# Patient Record
Sex: MALE | Race: WHITE | Employment: FULL TIME | URBAN - METROPOLITAN AREA
[De-identification: names, ages, dates, MRNs, and addresses within clinical notes are randomized per-mention and may not be internally consistent; named-entity substitution may affect disease eponyms.]

---

## 2022-01-04 ENCOUNTER — APPOINTMENT (OUTPATIENT)
Dept: CT IMAGING | Age: 57
End: 2022-01-04
Payer: COMMERCIAL

## 2022-01-04 ENCOUNTER — HOSPITAL ENCOUNTER (EMERGENCY)
Age: 57
Discharge: HOME | End: 2022-01-04
Attending: EMERGENCY MEDICINE
Payer: COMMERCIAL

## 2022-01-04 VITALS
RESPIRATION RATE: 14 BRPM | DIASTOLIC BLOOD PRESSURE: 90 MMHG | OXYGEN SATURATION: 98 % | BODY MASS INDEX: 27.09 KG/M2 | HEIGHT: 72 IN | SYSTOLIC BLOOD PRESSURE: 168 MMHG | HEART RATE: 82 BPM | TEMPERATURE: 97.8 F | WEIGHT: 200 LBS

## 2022-01-04 DIAGNOSIS — R42 DIZZINESS: Primary | ICD-10-CM

## 2022-01-04 DIAGNOSIS — I67.82 SUBCORTICAL MICROVASCULAR ISCHEMIC OCCLUSIVE DISEASE: ICD-10-CM

## 2022-01-04 LAB
ALBUMIN SERPL-MCNC: 5 GM/DL (ref 3.4–5)
ALP BLD-CCNC: 100 IU/L (ref 40–129)
ALT SERPL-CCNC: 17 U/L (ref 10–40)
ANION GAP SERPL CALCULATED.3IONS-SCNC: 14 MMOL/L (ref 4–16)
AST SERPL-CCNC: 17 IU/L (ref 15–37)
BASOPHILS ABSOLUTE: 0.1 K/CU MM
BASOPHILS RELATIVE PERCENT: 0.8 % (ref 0–1)
BILIRUB SERPL-MCNC: 0.4 MG/DL (ref 0–1)
BUN BLDV-MCNC: 12 MG/DL (ref 6–23)
CALCIUM SERPL-MCNC: 9.7 MG/DL (ref 8.3–10.6)
CHLORIDE BLD-SCNC: 101 MMOL/L (ref 99–110)
CO2: 24 MMOL/L (ref 21–32)
CREAT SERPL-MCNC: 0.9 MG/DL (ref 0.9–1.3)
DIFFERENTIAL TYPE: ABNORMAL
EKG ATRIAL RATE: 69 BPM
EKG DIAGNOSIS: NORMAL
EKG P AXIS: 56 DEGREES
EKG P-R INTERVAL: 168 MS
EKG Q-T INTERVAL: 388 MS
EKG QRS DURATION: 88 MS
EKG QTC CALCULATION (BAZETT): 415 MS
EKG R AXIS: 63 DEGREES
EKG T AXIS: 61 DEGREES
EKG VENTRICULAR RATE: 69 BPM
EOSINOPHILS ABSOLUTE: 0.1 K/CU MM
EOSINOPHILS RELATIVE PERCENT: 1.7 % (ref 0–3)
GFR AFRICAN AMERICAN: >60 ML/MIN/1.73M2
GFR NON-AFRICAN AMERICAN: >60 ML/MIN/1.73M2
GLUCOSE BLD-MCNC: 118 MG/DL (ref 70–99)
HCT VFR BLD CALC: 46.4 % (ref 42–52)
HEMOGLOBIN: 16.1 GM/DL (ref 13.5–18)
IMMATURE NEUTROPHIL %: 0.8 % (ref 0–0.43)
LYMPHOCYTES ABSOLUTE: 1.4 K/CU MM
LYMPHOCYTES RELATIVE PERCENT: 17.6 % (ref 24–44)
MCH RBC QN AUTO: 32.3 PG (ref 27–31)
MCHC RBC AUTO-ENTMCNC: 34.7 % (ref 32–36)
MCV RBC AUTO: 93.2 FL (ref 78–100)
MONOCYTES ABSOLUTE: 0.7 K/CU MM
MONOCYTES RELATIVE PERCENT: 8.4 % (ref 0–4)
PDW BLD-RTO: 12.8 % (ref 11.7–14.9)
PLATELET # BLD: 218 K/CU MM (ref 140–440)
PMV BLD AUTO: 11 FL (ref 7.5–11.1)
POTASSIUM SERPL-SCNC: 4.1 MMOL/L (ref 3.5–5.1)
RBC # BLD: 4.98 M/CU MM (ref 4.6–6.2)
SEGMENTED NEUTROPHILS ABSOLUTE COUNT: 5.5 K/CU MM
SEGMENTED NEUTROPHILS RELATIVE PERCENT: 70.7 % (ref 36–66)
SODIUM BLD-SCNC: 139 MMOL/L (ref 135–145)
TOTAL IMMATURE NEUTOROPHIL: 0.06 K/CU MM
TOTAL PROTEIN: 7.2 GM/DL (ref 6.4–8.2)
WBC # BLD: 7.7 K/CU MM (ref 4–10.5)

## 2022-01-04 PROCEDURE — 93005 ELECTROCARDIOGRAM TRACING: CPT | Performed by: EMERGENCY MEDICINE

## 2022-01-04 PROCEDURE — 85025 COMPLETE CBC W/AUTO DIFF WBC: CPT

## 2022-01-04 PROCEDURE — 70498 CT ANGIOGRAPHY NECK: CPT

## 2022-01-04 PROCEDURE — 99284 EMERGENCY DEPT VISIT MOD MDM: CPT

## 2022-01-04 PROCEDURE — 70450 CT HEAD/BRAIN W/O DYE: CPT

## 2022-01-04 PROCEDURE — 6360000004 HC RX CONTRAST MEDICATION: Performed by: EMERGENCY MEDICINE

## 2022-01-04 PROCEDURE — 6370000000 HC RX 637 (ALT 250 FOR IP): Performed by: EMERGENCY MEDICINE

## 2022-01-04 PROCEDURE — 80053 COMPREHEN METABOLIC PANEL: CPT

## 2022-01-04 PROCEDURE — 93010 ELECTROCARDIOGRAM REPORT: CPT | Performed by: INTERNAL MEDICINE

## 2022-01-04 RX ORDER — MECLIZINE HYDROCHLORIDE 25 MG/1
25 TABLET ORAL 3 TIMES DAILY PRN
Qty: 15 TABLET | Refills: 0 | Status: SHIPPED | OUTPATIENT
Start: 2022-01-04 | End: 2022-01-14

## 2022-01-04 RX ORDER — MECLIZINE HCL 12.5 MG/1
25 TABLET ORAL ONCE
Status: COMPLETED | OUTPATIENT
Start: 2022-01-04 | End: 2022-01-04

## 2022-01-04 RX ADMIN — IOPAMIDOL 75 ML: 755 INJECTION, SOLUTION INTRAVENOUS at 11:45

## 2022-01-04 RX ADMIN — MECLIZINE 25 MG: 12.5 TABLET ORAL at 10:03

## 2022-01-04 NOTE — ED NOTES
The patient presents to the er today with complaints of dizziness for a month and blurred vision that just recently started. He does have a PCP appointment later this month.                         Vincent Bravo, LATISHA  01/04/22 8612

## 2022-01-04 NOTE — ED NOTES
Pt resting, updated plan of care, call light in reach . Pt denies needs at this time.       Corine Ross RN  01/04/22 9682

## 2022-01-04 NOTE — ED PROVIDER NOTES
Triage Chief Complaint:   Dizziness (reports of dizziness for a month) and Eye Problem (reports of \"double vision\" )      [place]:  Juan Alberto Justice is a 64 y.o. male that presents to the emergency department with dizziness and double vision. He states that he has been dizzy for over a month. He states that he equates it to \"feeling drunk and like it had a few drinks. \"  He denies chest pain, shortness of breath, nausea, vomiting. He states that over the past week or so he is noted some double vision. 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 \"  He states that he has to cover one eye in order to see properly and then the symptoms resolved. He denies numbness tingling or weakness down his face, arms or legs. He denies dysarthria, dysphagia or facial droop. He is very concerned about the possibility of a stroke. He has no history of stroke. He does smoke daily. [facility]. No pertinent past medical history. Past Surgical History:   Procedure Laterality Date    TONSILLECTOMY       History reviewed. No pertinent family history. Social History     Socioeconomic History    Marital status:      Spouse name: Not on file    Number of children: Not on file    Years of education: Not on file    Highest education level: Not on file   Occupational History    Not on file   Tobacco Use    Smoking status: Current Every Day Smoker     Packs/day: 1.00     Types: Cigarettes    Smokeless tobacco: Never Used   Substance and Sexual Activity    Alcohol use:  Yes     Alcohol/week: 5.0 standard drinks     Types: 5 Cans of beer per week    Drug use: No    Sexual activity: Never   Other Topics Concern    Not on file   Social History Narrative    Not on file     Social Determinants of Health     Financial Resource Strain:     Difficulty of Paying Living Expenses: Not on file Food Insecurity:     Worried About Running Out of Food in the Last Year: Not on file    Jay of Food in the Last Year: Not on file   Transportation Needs:     Lack of Transportation (Medical): Not on file    Lack of Transportation (Non-Medical): Not on file   Physical Activity:     Days of Exercise per Week: Not on file    Minutes of Exercise per Session: Not on file   Stress:     Feeling of Stress : Not on file   Social Connections:     Frequency of Communication with Friends and Family: Not on file    Frequency of Social Gatherings with Friends and Family: Not on file    Attends Shinto Services: Not on file    Active Member of [address] [place] [address] [email or web address] or Organizations: Not on file    Attends Club or Organization Meetings: Not on file    Marital Status: Not on file   Intimate Partner Violence:     Fear of Current or Ex-Partner: Not on file    Emotionally Abused: Not on file    Physically Abused: Not on file    Sexually Abused: Not on file   Housing Stability:     Unable to Pay for Housing in the Last Year: Not on file    Number of [place] in the Last Year: Not on file    Unstable Housing in the Last Year: Not on file     No current facility-administered medications for this encounter. Current Outpatient Medications   Medication Sig Dispense Refill    meclizine (ANTIVERT) 25 MG tablet Take 1 tablet by mouth 3 times daily as needed for Dizziness 15 tablet 0     Allergies   Allergen Reactions    Codeine      Nursing Notes Reviewed    ROS:  At least 10 systems reviewed and otherwise negative except as in the [address] Physical Exam:  ED Triage Vitals [01/04/22 0846]   Enc Vitals Group      BP (!) 168/90      Pulse 82      Resp 14      Temp 97.8 °F (36.6 °C)      Temp Source Oral      SpO2 98 %      Weight 200 lb (90.7 kg)      Height 6' (1.829 m)      Head Circumference       Peak Flow       Pain Score       Pain Loc       Pain Edu? Excl. in [address]?       My pulse oximetry interpretation is which is within the normal range    GENERAL APPEARANCE: Awake and alert. Cooperative. No acute distress. HEAD:  Atraumatic. EYES: EOM's grossly intact. ENT: Mucous membranes are moist.  No trismus. Pupils equal round reactive to light  NECK:  Trachea midline. HEART: RRR. Radial pulses 2+. LUNGS: Respirations unlabored. CTAB  ABDOMEN: Soft. Non-tender. No guarding or rebound. EXTREMITIES: No acute deformities. SKIN: Warm and dry. NEUROLOGICAL: No gross facial drooping. Moves all 4 extremities spontaneously. NIH stroke scale 0. Ambulates with a steady gait. PSYCHIATRIC: Normal mood.     I have reviewed and interpreted all of the currently available lab results from this visit (if applicable):  Results for orders placed or performed during the hospital encounter of 01/04/22   CBC Auto Differential   Result Value Ref Range    WBC 7.7 4.0 - 10.5 K/CU MM    RBC 4.98 4.6 - 6.2 M/CU MM    Hemoglobin 16.1 13.5 - 18.0 GM/DL    Hematocrit 46.4 42 - 52 %    MCV 93.2 78 - 100 FL    MCH 32.3 (H) 27 - 31 PG    MCHC 34.7 32.0 - 36.0 %    RDW 12.8 11.7 - 14.9 %    Platelets 931 032 - 019 K/CU MM    MPV 11.0 7.5 - 11.1 FL    Differential Type AUTOMATED DIFFERENTIAL     Segs Relative 70.7 (H) 36 - 66 %    Lymphocytes % 17.6 (L) 24 - 44 %    Monocytes % 8.4 (H) 0 - 4 %    Eosinophils % 1.7 0 - 3 %    Basophils % 0.8 0 - 1 %    Segs Absolute 5.5 K/CU MM    Lymphocytes Absolute 1.4 K/CU MM    Monocytes Absolute 0.7 K/CU MM    Eosinophils Absolute 0.1 K/CU MM    Basophils Absolute 0.1 K/CU MM    Immature Neutrophil % 0.8 (H) 0 - 0.43 %    Total Immature Neutrophil 0.06 K/CU MM   Comprehensive Metabolic Panel w/ Reflex to MG   Result Value Ref Range    Sodium 139 135 - 145 MMOL/L    Potassium 4.1 3.5 - 5.1 MMOL/L    Chloride 101 99 - 110 mMol/L    CO2 24 21 - 32 MMOL/L    BUN 12 6 - 23 MG/DL    CREATININE 0.9 0.9 - 1.3 MG/DL    Glucose 118 (H) 70 - 99 MG/DL    Calcium 9.7 8.3 - 10.6 MG/DL    Albumin 5.0 3.4 - 5.0 GM/DL    Total Protein 7.2 6.4 - 8.2 GM/DL    Total Bilirubin 0.4 0.0 - 1.0 MG/DL    ALT 17 10 - 40 U/L    AST 17 15 - 37 IU/L    Alkaline Phosphatase 100 40 - 129 IU/L    GFR Non-African American >60 >60 mL/min/1.73m2    GFR African American >60 >60 mL/min/1.73m2    Anion Gap 14 4 - 16   EKG 12 Lead   Result Value Ref Range    Ventricular Rate 69 BPM    Atrial Rate 69 BPM    P-R Interval 168 ms    QRS Duration 88 ms    Q-T Interval 388 ms    QTc Calculation (Bazett) 415 ms    P Axis 56 degrees    R Axis 63 degrees    T Axis 61 degrees    Diagnosis       Normal sinus rhythm  Normal ECG  No previous ECGs available            EKG: (All EKG's are interpreted by myself in the absence of a cardiologist)  12 lead EKG:  Normal Sinus Rhythm 69  Axis is   Normal  QTc is  normal  There is no specific ST-T wave changes appreciated. There is no clear evidence of acute ischemia or infarction. It was compared against an EKG from none. MDM:  Patient's NIH stroke scale is 0. His blood work at this time is unremarkable. CT of the head shows no acute abnormalities. CTA of the head and neck shows a small chronic infarct within the right basal ganglia there is also mild chronic microvascular ischemic changes. They do note some stenosis of the right vertebral artery and mild stenosis of the left vertebral artery but no flow-limiting stenosis of the internal carotid arteries. Patient and I had a long discussion about all of the symptoms. I do not believe that his symptoms today represent an acute stroke nor is it seen on CAT scan or in my physical exam.  I did explain that he has likely had a stroke before and he does have some microvascular ischemic changes and also some stenosis. He does have a follow-up appointment with his primary care physician next week. I explained that at this point we need to mitigate his risk of stroke.   I suggested smoking cessation, increased exercise and watching his diet as well as following up with primary care physician for further evaluation of his blood pressure and cholesterol levels. Patient is agreeable with all of this and discharged home in good condition. Clinical Impression:  1. Dizziness    2.  Subcortical microvascular ischemic occlusive disease        Disposition Vitals:  [unfilled], [unfilled], [unfilled], [unfilled]    Disposition referral (if applicable):  BILLY Tamayovaishnavi [address]  [phone]    Schedule an appointment as soon as possible for a visit in 1 week        Disposition medications (if applicable):  Discharge Medication List as of 1/4/2022  1:01 PM      START taking these medications    Details   meclizine (ANTIVERT) 25 MG tablet Take 1 tablet by mouth 3 times daily as needed for Dizziness, Disp-15 tablet, R-0Normal               (Please note that portions of this note may have been completed with a voice recognition program. Efforts were made to edit the dictations but occasionally words are mis-transcribed.)    MD Flako Zhang MD  01/04/22 2741

## 2022-01-19 ENCOUNTER — OFFICE VISIT (OUTPATIENT)
Dept: NEUROLOGY | Age: 57
End: 2022-01-19
Payer: COMMERCIAL

## 2022-01-19 VITALS
DIASTOLIC BLOOD PRESSURE: 92 MMHG | HEART RATE: 66 BPM | OXYGEN SATURATION: 98 % | WEIGHT: 188 LBS | SYSTOLIC BLOOD PRESSURE: 162 MMHG | BODY MASS INDEX: 25.47 KG/M2 | HEIGHT: 72 IN

## 2022-01-19 DIAGNOSIS — I63.511 CEREBROVASCULAR ACCIDENT (CVA) DUE TO STENOSIS OF RIGHT MIDDLE CEREBRAL ARTERY (HCC): Primary | ICD-10-CM

## 2022-01-19 DIAGNOSIS — H53.2 BINOCULAR VISION DISORDER WITH DIPLOPIA: ICD-10-CM

## 2022-01-19 DIAGNOSIS — G43.809 OTHER MIGRAINE WITHOUT STATUS MIGRAINOSUS, NOT INTRACTABLE: ICD-10-CM

## 2022-01-19 DIAGNOSIS — F51.04 PSYCHOPHYSIOLOGIC INSOMNIA: ICD-10-CM

## 2022-01-19 DIAGNOSIS — G47.30 SLEEP APNEA, UNSPECIFIED TYPE: ICD-10-CM

## 2022-01-19 DIAGNOSIS — G47.419 SLEEP ATTACK: ICD-10-CM

## 2022-01-19 PROCEDURE — 99205 OFFICE O/P NEW HI 60 MIN: CPT | Performed by: STUDENT IN AN ORGANIZED HEALTH CARE EDUCATION/TRAINING PROGRAM

## 2022-01-19 RX ORDER — TIZANIDINE 4 MG/1
4 TABLET ORAL NIGHTLY
Qty: 30 TABLET | Refills: 2 | Status: SHIPPED | OUTPATIENT
Start: 2022-01-19

## 2022-01-19 RX ORDER — ATORVASTATIN CALCIUM 20 MG/1
20 TABLET, FILM COATED ORAL NIGHTLY
COMMUNITY
Start: 2022-01-13

## 2022-01-19 RX ORDER — GABAPENTIN 300 MG/1
300 CAPSULE ORAL 2 TIMES DAILY
COMMUNITY
Start: 2022-01-13

## 2022-01-19 RX ORDER — LISINOPRIL 10 MG/1
10 TABLET ORAL DAILY
COMMUNITY
Start: 2022-01-13

## 2022-01-19 RX ORDER — CLOPIDOGREL BISULFATE 75 MG/1
75 TABLET ORAL DAILY
COMMUNITY
Start: 2022-01-13

## 2022-01-19 NOTE — PROGRESS NOTES
1/19/22    Juan Alberto Justice  1965    Chief Complaint   Patient presents with    New Patient     Stroke       Neurologic Consult Note    Subjective    History of Present Illness  Mark Roblero is a 64 y.o. male presenting today for neurologic evaluation of possible stroke. He presented to [facility] emergency department on 1/4/2022 with a sensation of dizziness that he described as \"feeling drunk\". He also reported double vision intermittently present over the week prior. His NIH in the emergency department was 0. He was deemed not a candidate for tPA. CT head in the emergency department was nonacute but did demonstrate a small chronic infarct in the right basal ganglia as well as mild chronic microvascular ischemic changes. CTA did demonstrate mild stenosis of the bilateral vertebral arteries but was otherwise nonacute. 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 This lasted seconds and resolved. He tells me that essentially as soon as he noticed it he essentially startled himself and it was gone. This has since happened four more times. He was driving with each occurrence. He cannot recall what he was doing prior -- for example is uncertain if he had turned his head for a stephan change. He has been chronically tired. He does admit that he may have been tired with this episode. Denies vertigo with this. Denies focal weakness or paresthesias. He feels confused since that time and describes it as though he has had a couple drinks. Admits that it feels like brain fog. He admits to daily headache. He describes the headache as throbbing in nature and located bilateral, occipitally. He is sound sensitive. He has had nausea but no vomiting associated with the headache. He has never been on prescription medications for headache.     He tells me that he has had hypnogogic hallucinations. He tells me he is a chronically tired person. Denies symptoms consistent with cataplexy. He admits to having come close to falling asleep behind the wheel. He admits to snoring with nighttime apneic events. He admits to morning dry mouth and sore throat. He admits to morning headaches. He has difficulty falling asleep at nighttime due to mind wandering. He denies proximal muscle weakness or temporal course to his symptoms. He does admit to lightheadedness with looking up. He has never had a syncopal event in the past.     Denies personal history of seizure. Denies family history of seizure. He is a daily smoker and is actively trying to quit. Review of Symptoms:  Neurologic   Symptoms: no difficulty with gait or walking, no bowel symptoms, no vertigo, + confusion, no memory loss, no speech disorder, no visual loss, +double vision, no dizziness, no loss of hearing, no sensory disturbances, no weakness, no headaches, no bladder symptoms, no seizures, + excessive fatigue and no syncope    Current Outpatient Medications   Medication Sig Dispense Refill    tiZANidine (ZANAFLEX) 4 MG tablet Take 1 tablet by mouth nightly 30 tablet 2    lisinopril (PRINIVIL;ZESTRIL) 10 MG tablet Take 10 mg by mouth daily (Patient not taking: Reported on 1/19/2022)      atorvastatin (LIPITOR) 20 MG tablet Take 20 mg by mouth at bedtime (Patient not taking: Reported on 1/19/2022)      gabapentin (NEURONTIN) 300 MG capsule Take 300 mg by mouth 2 times daily. (Patient not taking: Reported on 1/19/2022)      clopidogrel (PLAVIX) 75 MG tablet Take 75 mg by mouth daily (Patient not taking: Reported on 1/19/2022)       No current facility-administered medications for this visit. No past medical history on file.     Past Surgical History:   Procedure Laterality Date    TONSILLECTOMY          Social History     Socioeconomic History    Marital status:      Spouse name: Not on file    Number of children: Not on file    Years of education: Not on file    Highest education level: Not on file   Occupational History    Not on file   Tobacco Use    Smoking status: Current Every Day Smoker     Packs/day: 1.00     Types: Cigarettes    Smokeless tobacco: Never Used   Substance and Sexual Activity    Alcohol use: Yes     Alcohol/week: 5.0 standard drinks     Types: 5 Cans of beer per week    Drug use: No    Sexual activity: Never   Other Topics Concern    Not on file   Social History Narrative    Not on file     Social Determinants of Health     Financial Resource Strain:     Difficulty of Paying Living Expenses: Not on file   Food Insecurity:     Worried About Running Out of Food in the Last Year: Not on file    Jay of Food in the Last Year: Not on file   Transportation Needs:     Lack of Transportation (Medical): Not on file    Lack of Transportation (Non-Medical): Not on file   Physical Activity:     Days of Exercise per Week: Not on file    Minutes of Exercise per Session: Not on file   Stress:     Feeling of Stress : Not on file   Social Connections:     Frequency of Communication with Friends and Family: Not on file    Frequency of Social Gatherings with Friends and Family: Not on file    Attends Evangelical Services: Not on file    Active Member of [address] [place] [address] [employer] or Organizations: Not on file    Attends Club or Organization Meetings: Not on file    Marital Status: Not on file   Intimate Partner Violence:     Fear of Current or Ex-Partner: Not on file    Emotionally Abused: Not on file    Physically Abused: Not on file    Sexually Abused: Not on file   Housing Stability:     Unable to Pay for Housing in the Last Year: Not on file    Number of [place] in the Last Year: Not on file    Unstable Housing in the Last Year: Not on file       No family history on file.     Objective    Physical Exam:  Also present during visit: Granddaughter, Prateek Lenz  Constitutional   Weight: well nourished  Heart/Vascular   No cyanosis or clubbing appreciated  Neck   Appearance/Palpation/Auscultation: supple  Mental Status   Orientation: oriented to person, oriented to place, oriented to problem and oriented to time   Mood/Affect: appropriate mood and appropriate affect   Memory/Other: recent memory intact, remote memory intact, fund of knowledge intact, attention span normal and concentration normal  Language   Language: (normal) language, no dysarthria, (normal) articulation and no dysphasia/aphasia  Cranial Nerves   CN II Right: visual fields appear intact   CN II Left: visual fields appear intact   CN III, IV, VI: EOM no nystagmus, normal pursuit and extraocular muscle strength normal   CN III: pupil normal size, pupil reactive to light and dark, pupil accomodates and no ptosis   CN IV: normal   CN VI: normal   CN V Right: normal sensation and muscles of mastication intact   CN V Left: normal sensation and muscles of mastication intact   CN VII Right: normal facial expression   CN VII Left: normal facial expression   CN VIII Right: hearing in tact to normal conversation   CN VIII Left: hearing in tact to normal conversation   CN IX,X: normal palatal movement   CN XI Right: normal sternocleidomastoid and normal trapezius   CN XI Left: normal sternocleidomastoid and normal trapezius   CN XII: no tremors of the tongue, no fasciculation of the tongue, tongue protrudes midline, normal power to left and normal power to right  Gait and Stance   Gait/Posture: station normal, ambulates independently, and gait normal  Motor/Coordination Exam   General: no bradykinesia, no tremors, no chorea, no athetosis, no myoclonus and no dyskinesia   Right Upper Extremity: normal motor strength, normal bulk and normal tone   Left Upper Extremity: normal motor strength, normal bulk and normal tone   Right Lower Extremity: normal motor strength, normal bulk and normal tone   Left Lower Extremity: normal motor strength, normal bulk and normal tone   Coordination: no drift, normal finger-to-nose, normal heel-to-shin and rapid alternating movements normal  Reflexes   Reflexes Right: 2/4 biceps, brachioradialis, patellar, and 1/4 achilles    Reflexes Left: 2/4 biceps, brachioradialis, patellar, and 1/4 achilles    Plantar Reflex Right: response downgoing   Plantar Reflex Left: response downgoing   Hoffmans Reflex Right: absent   Hoffmans Reflex Left: absent    Sensory   Sensation: normal light touch, normal temperature, normal vibration, normal DSS, and no neglect    Lungs   No auditory wheezing  Skin   Inspection: no jaundice, no lesions, no rashes and no cyanosis      BP (!) 162/92 (Site: Left Upper Arm, Position: Sitting, Cuff Size: Medium Adult)   Pulse 66   Ht 6' (1.829 m)   Wt 188 lb (85.3 kg)   SpO2 98%   BMI 25.50 kg/m²     Assessment and Plan     Diagnosis Orders   1. Cerebrovascular accident (CVA) due to stenosis of right middle cerebral artery (HCC)  LIPID PANEL    Hemoglobin A1C    MRI BRAIN WO CONTRAST   2. Binocular vision disorder with diplopia  MRI BRAIN WO CONTRAST   3. Sleep apnea, unspecified type  Amb External Referral To Sleep Medicine   4. Sleep attack  Amb External Referral To Sleep Medicine   5. Other migraine without status migrainosus, not intractable  tiZANidine (ZANAFLEX) 4 MG tablet     Joanna Shields was seen today in neurologic consultation regarding transient neurologic episodes of head drop and diplopia lasting seconds at a time. In conversation with him, I am most concerned that these represent sleep attacks as he reports excessive daytime sleepiness and poor sleep. Additionally, he states that he essentially startles himself and the episode immediately resolves. I do feel that he has underlying untreated sleep apnea and that this is likely the culprit of these events. However, he also reports hypnagogic hallucinations.   I would like for him to be evaluated by sleep medicine for possible sleep apnea versus narcolepsy. He is agreeable to this today. He is not currently driving for work which I do feel is appropriate while we complete further work-up. Given he did endorse binocular diplopia, I would like to obtain an MRI brain to further rule out structural etiology, however, I am suspicious his diplopia was secondary to drowsiness. I feel this is less likely of neuromuscular etiology such as myasthenia gravis given there is no temporal course to the description of his events and he describes no other symptoms consistent with myasthenia. I did express to him today that I am reassured by his nonfocal and nonlateralizing neurologic examination. Furthermore, I am pleased to see that CTA head and neck is without significant vascular abnormality which essentially makes diagnosis such as vertebrobasilar insufficiency with drop attacks much less likely. I did spend time today reviewing his most recent CT head which did demonstrate a chronic right basal ganglia infarct. He will continue on antiplatelet monotherapy with either aspirin or Plavix in addition to statin medications for secondary stroke prevention. We spent time today discussing that he does not need to be on dual antiplatelet therapy from my standpoint. Lastly, Bettye Frankel also endorses symptoms consistent with chronic migraine without aura. We discussed migraine in detail today. I discussed with him that given he currently has daily headache at this point, I would like to initiate him on a preventative medication. We spent time discussing potential treatment options and ultimately decided on tizanidine as he also endorses history of chronic neck pain that he feels is contributing. I would like for him to start at tizanidine 2 mg nightly with titration up to 4 mg nightly. I feel this will be beneficial to aid with cervical muscle tension, insomnia as well as migraine headaches. He is agreeable and eager to try this.   I also feel that getting any untreated sleep apnea addressed will also result in improvement of daily headaches. Medications prescribed for the patient were discussed in detail. This included a discussion of the potential risks vs the potential benefits of the medications. The patient was given time to ask questions and these were answered to the best of my ability. The patient appeared to understand the information provided. Thank you for allowing us to participate in the care of your patient. Please do not hesitate to contact us with questions. Return in about 3 months (around 4/19/2022) for follow up with [facility].     Al Up DO

## 2022-01-19 NOTE — PATIENT INSTRUCTIONS
-Sleep medicine referral to evaluate for possible sleep apnea vs narcolepsy (less likely)  -Complete MRI brain, labs  -Start tizanidine 2 mg (half tab) nightly for 8 nights then increase to 4 mg thereafter  -Continue Lipitor and antiplatelet (aspirin or plavix -- don't need both)

## 2022-01-28 ENCOUNTER — HOSPITAL ENCOUNTER (OUTPATIENT)
Dept: MRI IMAGING | Age: 57
Discharge: HOME | End: 2022-01-28
Payer: COMMERCIAL

## 2022-01-28 DIAGNOSIS — H53.2 BINOCULAR VISION DISORDER WITH DIPLOPIA: ICD-10-CM

## 2022-01-28 DIAGNOSIS — I63.511 CEREBROVASCULAR ACCIDENT (CVA) DUE TO STENOSIS OF RIGHT MIDDLE CEREBRAL ARTERY (HCC): ICD-10-CM

## 2022-01-28 PROCEDURE — 70551 MRI BRAIN STEM W/O DYE: CPT

## 2022-01-29 LAB
CHOLESTEROL, TOTAL: 196 MG/DL
CHOLESTEROL/HDL RATIO: NORMAL
ESTIMATED AVERAGE GLUCOSE: NORMAL
HBA1C MFR BLD: 5.5 %
HDLC SERPL-MCNC: 36 MG/DL (ref 35–70)
LDL CHOLESTEROL CALCULATED: 123 MG/DL (ref 0–160)
NONHDLC SERPL-MCNC: NORMAL MG/DL
TRIGL SERPL-MCNC: 211 MG/DL
VLDLC SERPL CALC-MCNC: 37 MG/DL

## 2022-02-08 DIAGNOSIS — I63.511 CEREBROVASCULAR ACCIDENT (CVA) DUE TO STENOSIS OF RIGHT MIDDLE CEREBRAL ARTERY (HCC): ICD-10-CM

## 2023-06-08 ENCOUNTER — HOSPITAL ENCOUNTER (EMERGENCY)
Age: 58
Discharge: HOME | End: 2023-06-08
Attending: EMERGENCY MEDICINE
Payer: COMMERCIAL

## 2023-06-08 ENCOUNTER — APPOINTMENT (OUTPATIENT)
Dept: GENERAL RADIOLOGY | Age: 58
End: 2023-06-08
Payer: COMMERCIAL

## 2023-06-08 VITALS
HEART RATE: 85 BPM | HEIGHT: 72 IN | DIASTOLIC BLOOD PRESSURE: 87 MMHG | OXYGEN SATURATION: 97 % | TEMPERATURE: 98.2 F | BODY MASS INDEX: 27.09 KG/M2 | SYSTOLIC BLOOD PRESSURE: 164 MMHG | RESPIRATION RATE: 17 BRPM | WEIGHT: 200 LBS

## 2023-06-08 DIAGNOSIS — S93.402A SPRAIN OF LEFT ANKLE, UNSPECIFIED LIGAMENT, INITIAL ENCOUNTER: Primary | ICD-10-CM

## 2023-06-08 DIAGNOSIS — S39.012A STRAIN OF LUMBAR REGION, INITIAL ENCOUNTER: ICD-10-CM

## 2023-06-08 PROCEDURE — 6360000002 HC RX W HCPCS: Performed by: EMERGENCY MEDICINE

## 2023-06-08 PROCEDURE — 73610 X-RAY EXAM OF ANKLE: CPT

## 2023-06-08 RX ORDER — KETOROLAC TROMETHAMINE 30 MG/ML
30 INJECTION, SOLUTION INTRAMUSCULAR; INTRAVENOUS ONCE
Status: COMPLETED | OUTPATIENT
Start: 2023-06-08 | End: 2023-06-08

## 2023-06-08 RX ORDER — DIAZEPAM 5 MG/1
5 TABLET ORAL EVERY 8 HOURS PRN
Qty: 10 TABLET | Refills: 0 | Status: SHIPPED | OUTPATIENT
Start: 2023-06-08 | End: 2023-06-18

## 2023-06-08 RX ADMIN — KETOROLAC TROMETHAMINE 30 MG: 30 INJECTION, SOLUTION INTRAMUSCULAR; INTRAVENOUS at 11:31

## 2023-06-08 ASSESSMENT — PAIN DESCRIPTION - FREQUENCY: FREQUENCY: CONTINUOUS

## 2023-06-08 ASSESSMENT — PAIN DESCRIPTION - DESCRIPTORS: DESCRIPTORS: ACHING

## 2023-06-08 ASSESSMENT — PAIN DESCRIPTION - LOCATION: LOCATION: BACK

## 2023-06-08 ASSESSMENT — PAIN - FUNCTIONAL ASSESSMENT: PAIN_FUNCTIONAL_ASSESSMENT: 0-10

## 2023-06-08 ASSESSMENT — PAIN DESCRIPTION - ORIENTATION: ORIENTATION: LOWER

## 2023-06-08 ASSESSMENT — PAIN SCALES - GENERAL: PAINLEVEL_OUTOF10: 7

## 2023-06-08 NOTE — ED PROVIDER NOTES
mg IntraMUSCular Given 6/8/23 1131)       Disposition Considerations (tests considered but not done, Shared Decision Making, Pt Expectation of Test or Tx.):     I think patient is appropriate for outpatient management. Patient is given instructions regarding symptomatic care at home as well as return precautions. To call PCP for follow up in 2-3 days. Patient verbalizes understanding of all instructions and is comfortable with the plan of care. I am the Primary Clinician of Record. Final Impression:  1. Sprain of left ankle, unspecified ligament, initial encounter    2. Strain of lumbar region, initial encounter      DISPOSITION Decision To Discharge 06/08/2023 12:19:46 PM      Patient referred to:  Chinmay Pitt PA-C  [address] [id number]    Schedule an appointment as soon as possible for a visit in 2 days      [address] [place] [address] [address]  [phone]    If symptoms worsen    Discharge medications:  New Prescriptions    DIAZEPAM (VALIUM) 5 MG TABLET    Take 1 tablet by mouth every 8 hours as needed (muscle spasm) for up to 10 days.  Max Daily Amount: 15 mg     (Please note that portions of this note may have been completed with a voice recognition program. Efforts were made to edit the dictations but occasionally words are mis-transcribed.)    Juan Aguila,   06/08/23 0272

## 2023-06-08 NOTE — ED NOTES
Discharge instructions reviewed with patient. Medications and follow up were discussed.  Patient denies further questions and verbalizes understanding      Demetrius Keating RN  06/08/23 2862

## 2024-08-27 ENCOUNTER — HOSPITAL ENCOUNTER (EMERGENCY)
Age: 59
Discharge: HOME | End: 2024-08-27
Attending: EMERGENCY MEDICINE
Payer: COMMERCIAL

## 2024-08-27 VITALS
HEIGHT: 72 IN | BODY MASS INDEX: 26.41 KG/M2 | WEIGHT: 195 LBS | RESPIRATION RATE: 16 BRPM | SYSTOLIC BLOOD PRESSURE: 188 MMHG | TEMPERATURE: 97.1 F | HEART RATE: 72 BPM | OXYGEN SATURATION: 98 % | DIASTOLIC BLOOD PRESSURE: 92 MMHG

## 2024-08-27 DIAGNOSIS — G89.29 ACUTE EXACERBATION OF CHRONIC LOW BACK PAIN: Primary | ICD-10-CM

## 2024-08-27 DIAGNOSIS — M62.838 SPASM OF MUSCLE: ICD-10-CM

## 2024-08-27 DIAGNOSIS — M54.50 ACUTE EXACERBATION OF CHRONIC LOW BACK PAIN: Primary | ICD-10-CM

## 2024-08-27 PROCEDURE — 99284 EMERGENCY DEPT VISIT MOD MDM: CPT

## 2024-08-27 PROCEDURE — 96372 THER/PROPH/DIAG INJ SC/IM: CPT

## 2024-08-27 PROCEDURE — 6360000002 HC RX W HCPCS: Performed by: EMERGENCY MEDICINE

## 2024-08-27 RX ORDER — LOSARTAN POTASSIUM 100 MG/1
TABLET ORAL
COMMUNITY
Start: 2024-08-07

## 2024-08-27 RX ORDER — KETOROLAC TROMETHAMINE 15 MG/ML
15 INJECTION, SOLUTION INTRAMUSCULAR; INTRAVENOUS ONCE
Status: COMPLETED | OUTPATIENT
Start: 2024-08-27 | End: 2024-08-27

## 2024-08-27 RX ORDER — METHOCARBAMOL 500 MG/1
500 TABLET, FILM COATED ORAL 4 TIMES DAILY
Qty: 40 TABLET | Refills: 0 | Status: SHIPPED | OUTPATIENT
Start: 2024-08-27 | End: 2024-09-06

## 2024-08-27 RX ORDER — LIDOCAINE 50 MG/G
1 PATCH TOPICAL DAILY
Qty: 30 PATCH | Refills: 0 | Status: SHIPPED | OUTPATIENT
Start: 2024-08-27 | End: 2024-09-26

## 2024-08-27 RX ADMIN — KETOROLAC TROMETHAMINE 15 MG: 15 INJECTION, SOLUTION INTRAMUSCULAR; INTRAVENOUS at 18:09

## 2024-08-27 ASSESSMENT — PAIN - FUNCTIONAL ASSESSMENT
PAIN_FUNCTIONAL_ASSESSMENT: ACTIVITIES ARE NOT PREVENTED
PAIN_FUNCTIONAL_ASSESSMENT: 0-10

## 2024-08-27 ASSESSMENT — PAIN DESCRIPTION - DESCRIPTORS: DESCRIPTORS: THROBBING

## 2024-08-27 ASSESSMENT — PAIN DESCRIPTION - LOCATION: LOCATION: BACK

## 2024-08-27 ASSESSMENT — PAIN SCALES - GENERAL: PAINLEVEL_OUTOF10: 7

## 2024-08-27 ASSESSMENT — PAIN DESCRIPTION - ORIENTATION: ORIENTATION: RIGHT;LEFT;LOWER

## 2024-08-27 NOTE — ED PROVIDER NOTES
Emergency Department Encounter    Patient: Roosevelt Lanier  MRN: [id number]  : 1965  Date of Evaluation: 2024  ED Provider:  Amalia Curiel MD    Triage Chief Complaint:   Back Pain (Pt reports pain across bilateral lower back. No known injury. Pt took tylenol last about 4 hours PTA. Pt ambulated to bed per self, AOx4, breathing unlabored, skin warm and dry)    [place]:  Roosevelt Lanier is a 59 y.o. male that presents with concern for low back pain.  He has had issues with his back for over 20 years he tells me.  He had just driven to [place] for work, was climbing up and down ladders, pulling a lot of hose.  His 3 inch hose full of corn syrup.  He did not do anything specific that he can think of to tweak his back.  He drove back from [place] got home yesterday and was in so much pain he was almost crying.  He was not able to sleep well because of it.  It does hurt when he moves.  No weakness or numbness down his extremities.  He denies any incontinence.  He had not had any trauma.  He took Tylenol about 4 hours prior to arrival.  The pain is a little bit better today but he had to call off of work.  He is never seen a spine specialist.  He has not done physical therapy.  It feels very tight.  States he does not typically see doctors.  [facility], but has not seen them in some time.    ROS - see HPI, below listed is current ROS at time of my eval:  10 systems reviewed and negative except as above.     Past Medical History:   Diagnosis Date    Chronic back pain     Hypertension      Past Surgical History:   Procedure Laterality Date    TONSILLECTOMY       No family history on file.  Social History     Socioeconomic History    Marital status:      Spouse name: Not on file    Number of children: Not on file    Years of education: Not on file    Highest education level: Not on file   Occupational History    Not on file   Tobacco Use    Smoking  back pain    2. Spasm of muscle      Disposition referral (if applicable):  Yvon Mauricio PA-C  [address] [facility] [address]  [phone]    Schedule an appointment as soon as possible for a visit       [facility]  [address] [facility] [address]  [phone]    If symptoms worsen    Disposition medications (if applicable):  New Prescriptions    LIDOCAINE (LIDODERM) 5 %    Place 1 patch onto the skin daily 12 hours on, 12 hours off.    METHOCARBAMOL (ROBAXIN) 500 MG TABLET    Take 1 tablet by mouth 4 times daily for 10 days    NAPROXEN (NAPROSYN) 375 MG TABLET    Take 1 tablet by mouth 2 times daily (with meals)     ED Provider Disposition Time  DISPOSITION Discharge - Pending Orders Complete 08/27/2024 06:08:28 PM  Condition at Disposition: Stable      Comment: Please note this report has been produced using speech recognition software and may contain errors related to that system including errors in grammar, punctuation, and spelling, as well as words and phrases that may be inappropriate.  Efforts were made to edit the dictations.        Amalia Curiel MD  08/27/24 3827

## 2024-08-27 NOTE — DISCHARGE INSTR - COC
Continuity of Care Form    Patient Name: Roosevelt Lanier   :  1965  MRN:  [id number]    Admit date:  2024  Discharge date:  ***    Code Status Order: No Order   Advance Directives:   Advance Care Flowsheet Documentation             Admitting Physician:  No admitting provider for patient encounter.  PCP: Yvon Mauricio PA-C    Discharging Nurse: ***  Discharging Hospital Unit/Room#: ED-E02  Discharging Unit Phone Number: ***    Emergency Contact:   Extended Emergency Contact Information  Primary Emergency Contact: Veena Lanier  Address: [address] [place] [address] [facility]  Home Phone: [phone]  Relation: Spouse    Past Surgical History:  Past Surgical History:   Procedure Laterality Date    TONSILLECTOMY         Immunization History:     There is no immunization history on file for this patient.    Active Problems:  There is no problem list on file for this patient.      Isolation/Infection:   Isolation            No Isolation          Patient Infection Status       None to display            Nurse Assessment:  Last Vital Signs: BP (!) 204/91   Pulse 84   Temp 97.1 °F (36.2 °C) (Temporal)   Resp 16   Ht 1.829 m (6')   Wt 88.5 kg (195 lb)   SpO2 98%   BMI 26.45 kg/m²     Last documented pain score (0-10 scale): Pain Level: 7  Last Weight:   Wt Readings from Last 1 Encounters:   24 88.5 kg (195 lb)     Mental Status:  {IP PT MENTAL STATUS:22808}    IV Access:  { EMERY IV ACCESS:611756973}    Nursing Mobility/ADLs:  Walking   {CHP DME ADLs:859598214}  Transfer  {CHP DME ADLs:586517776}  Bathing  {CHP DME ADLs:471320996}  Dressing  {CHP DME ADLs:380109614}  Toileting  {CHP DME ADLs:598284283}  Feeding  {CHP DME ADLs:201079784}  Med Admin  {CHP DME ADLs:838489414}  Med Delivery   { EMERY MED Delivery:865864284}    Wound Care Documentation and Therapy:        Elimination:  Continence:   Bowel: {YES / NO:}  Bladder: {YES / NO:}  Urinary  Catheter: {Urinary Catheter:884457937}   Colostomy/Ileostomy/Ileal Conduit: {YES / NO:}       Date of Last BM: ***  No intake or output data in the 24 hours ending 24  No intake/output data recorded.    Safety Concerns:     { EMERY Safety Concerns:688920283}    Impairments/Disabilities:      { EMERY Impairments/Disabilities:295781852}    Nutrition Therapy:  Current Nutrition Therapy:   { EMERY Diet List:578995978}    Routes of Feeding: {[facility] DME Other Feedings:259431947}  Liquids: {Slp liquid thickness:85576}  Daily Fluid Restriction: {[facility] DME Yes amt example:429215223}  Last Modified Barium Swallow with Video (Video Swallowing Test): {Done Not Done Date:}    Treatments at the Time of Hospital Discharge:   Respiratory Treatments: ***  Oxygen Therapy:  {Therapy; copd oxygen:46577}  Ventilator:    { CC Vent List:903783222}    Rehab Therapies: {THERAPEUTIC INTERVENTION:4007467443}  Weight Bearing Status/Restrictions: {[facility] Weight Bearin}  Other Medical Equipment (for information only, NOT a DME order):  {EQUIPMENT:981376088}  Other Treatments: ***    Patient's personal belongings (please select all that are sent with patient):  {[facility] DME Belongings:354335770}    RN SIGNATURE:  {Esignature:146272401}    CASE MANAGEMENT/SOCIAL WORK SECTION    Inpatient Status Date: ***    Readmission Risk Assessment Score:  Readmission Risk              Risk of Unplanned Readmission:  0           Discharging to Facility/ Agency   Name:   Address:  Phone:  Fax:    Dialysis Facility (if applicable)   Name:  Address:  Dialysis Schedule:  Phone:  Fax:    / signature: {Esignature:775193771}    PHYSICIAN SECTION    Prognosis: {Prognosis:0863792359}    Condition at Discharge: { Patient Condition:472015946}    Rehab Potential (if transferring to Rehab): {Prognosis:1000767377}    Recommended Labs or Other Treatments After Discharge: ***    Physician Certification: I certify the above